# Patient Record
Sex: FEMALE | Race: WHITE | NOT HISPANIC OR LATINO | URBAN - METROPOLITAN AREA
[De-identification: names, ages, dates, MRNs, and addresses within clinical notes are randomized per-mention and may not be internally consistent; named-entity substitution may affect disease eponyms.]

---

## 2017-12-19 ENCOUNTER — IMPORTED ENCOUNTER (OUTPATIENT)
Dept: URBAN - METROPOLITAN AREA CLINIC 50 | Facility: CLINIC | Age: 62
End: 2017-12-19

## 2019-03-28 NOTE — PATIENT DISCUSSION
Continue: PreserVision AREDS 2 (vit c,z-tl-cgbil-lutein-zeaxan): capsule: [id number] mg-unit-mg-mg 1 capsule twice a day as directed by mouth

## 2019-03-28 NOTE — PATIENT DISCUSSION
CATARACTS, OU: VISUALLY SIGNIFICANT. OPTION OF SURGERY VERSUS FOLLOWING VERSUS UPDATING GLASSES DISCUSSED. RBA'S DISCUSSED, PATIENT UNDERSTANDS AND DESIRES SURGERY TO INCREASE VISION FOR READING AND WATCHING TV.  SCHEDULE CATARACT SURGERY/PRE-OP.

## 2019-03-28 NOTE — PATIENT DISCUSSION
KATHERINE OU:  PRESCRIBED UV PROTECTION TO SLOW GROWTH. PRESCRIBE ARTIFICAL TEARS TO INCREASE COMFORT.

## 2019-04-08 NOTE — PATIENT DISCUSSION
CATARACT OD: RBA'S DISCUSSED, PATIENT UNDERSTANDS AND DESIRES TO PROCEED WITH SURGERY. CONSENT READ AND SIGNED. PATIENT DESIRES STANDARD FOR DISTANCE VISION.  MAY WANT LRI, SHE WILL CALL BACK WITH HER DECISION

## 2019-04-08 NOTE — PATIENT DISCUSSION
Continue: PreserVision AREDS 2 (vit c,t-wr-hafut-lutein-zeaxan): capsule: [id number] mg-unit-mg-mg 1 capsule twice a day as directed by mouth

## 2019-05-01 NOTE — PATIENT DISCUSSION
Continue: PreserVision AREDS 2 (vit c,n-ku-pmkqr-lutein-zeaxan): capsule: [phone] mg-unit-mg-mg 1 capsule twice a day as directed by mouth

## 2019-05-15 NOTE — PATIENT DISCUSSION
CATARACT OS: RBA'S DISCUSSED, PATIENT UNDERSTANDS AND DESIRES TO PROCEED WITH SURGERY. CONSENT READ AND SIGNED.   PATIENT DESIRES STANDARD WITH LRI FOR DISTANCE

## 2019-05-15 NOTE — PATIENT DISCUSSION
CATARACT, OS: VISUALLY SIGNIFICANT. OPTION OF SURGERY VERSUS FOLLOWING VERSUS UPDATING GLASSES DISCUSSED. RBA'S DISCUSSED TODAY WITH DR. MACE, PATIENT UNDERSTANDS AND DESIRES SURGERY TO INCREASE VISION FOR TV AND READING. RTC FOR CAT SX OS.

## 2019-05-15 NOTE — PATIENT DISCUSSION
Pre-Op 2nd Eye Counseling: The patient has noticed an improvement in their visual symptoms in the operative eye. The patient complains of decreased vision in the fellow eye when reading and watching tv. It was explained to the patient that the decision to proceed with cataract surgery in the fellow eye is entirely a separate decision from the surgical eye. All of the same risks, benefits and alternatives ere reviewed with the patient again. The patient does feel the vision in the non-operative eye is limiting their daily activities and elects to proceed with cataract surgery in the LEFT eye. Schedule cataract surgery/ pre op OS.

## 2019-05-15 NOTE — PATIENT DISCUSSION
Pre-Op 2nd Eye Counseling: The patient has noticed an improvement in their visual symptoms in the operative eye. The patient complains of decreased vision in the fellow eye when driving and reading. It was explained to the patient that the decision to proceed with cataract surgery in the fellow eye is entirely a separate decision from the surgical eye. All of the same risks, benefits and alternatives ere reviewed with the patient again. The patient does feel the vision in the non-operative eye is limiting their daily activities and elects to proceed with cataract surgery in the LEFT eye.   Refer to Dr. Claudia Obrien for cataract surgery in the LEFT EYE

## 2020-03-12 NOTE — PATIENT DISCUSSION
DRY EYE SYNDROME OU: RX RESTASIS ONE DROP TWICE A DAY IN BOTH EYES AND ARTIFICIAL TEARS AS NEEDED TO INCREASE COMFORT OU. IF SYMPTOMS PERSIST CONSIDER PUNCTAL PLUGS.

## 2021-03-18 NOTE — PATIENT DISCUSSION
AMD (DRY), OU:  MAC OCT DONE TODAY TO DOCUMENT. PRESCRIBE AREDS 2 VITAMINS AND UV PROTECTION TO SLOW PROGRESSION. SMOKING CESSATION EMPHASIZED. PRESCRIBE REGULAR AMSLER GRID CHECKS TO SELF MONITOR. PATIENT TO CALL WITH CHANGES FOR RETINA CONSULT. RETURN FOR FOLLOW-UP AS SCHEDULED.

## 2021-04-17 ASSESSMENT — VISUAL ACUITY
OD_CC: J2@ 15 IN
OD_PH: 20/50
OS_PH: 20/40-1
OS_SC: 20/50-2
OD_SC: 20/100
OS_CC: J2@ 15 IN

## 2021-04-17 ASSESSMENT — TONOMETRY
OD_IOP_MMHG: 12
OS_IOP_MMHG: 13

## 2022-03-23 NOTE — PATIENT DISCUSSION
Discussed findings with patient. Rx AREDS 2 and monitor at home with [id number]. Patient educated on importance of UV protection, healthy diet and no smoking. Advised patient to RTC immediately if vision worsen and grid appears darker, wavy or blank.
